# Patient Record
Sex: FEMALE | Race: WHITE | URBAN - METROPOLITAN AREA
[De-identification: names, ages, dates, MRNs, and addresses within clinical notes are randomized per-mention and may not be internally consistent; named-entity substitution may affect disease eponyms.]

---

## 2017-07-11 ENCOUNTER — HOSPITAL ENCOUNTER (OUTPATIENT)
Dept: ULTRASOUND IMAGING | Age: 67
Discharge: HOME | End: 2017-07-11
Admitting: INTERNAL MEDICINE

## 2017-07-11 ENCOUNTER — HOSPITAL ENCOUNTER (OUTPATIENT)
Dept: MAMMOGRAPHY | Age: 67
Discharge: HOME | End: 2017-07-11
Admitting: INTERNAL MEDICINE

## 2017-07-11 DIAGNOSIS — R92.8 OTHER (ABNORMAL) FINDINGS ON RADIOLOGICAL EXAMINATION OF BREAST: ICD-10-CM

## 2017-10-20 ENCOUNTER — HOSPITAL ENCOUNTER (OUTPATIENT)
Dept: MAMMOGRAPHY | Age: 67
Discharge: HOME | End: 2017-10-20
Attending: INTERNAL MEDICINE | Admitting: INTERNAL MEDICINE

## 2017-10-20 DIAGNOSIS — Z12.31 VISIT FOR SCREENING MAMMOGRAM: ICD-10-CM

## 2018-10-26 ENCOUNTER — HOSPITAL ENCOUNTER (OUTPATIENT)
Dept: MAMMOGRAPHY | Age: 68
Discharge: HOME | End: 2018-10-31
Payer: MEDICAID

## 2018-10-26 DIAGNOSIS — Z12.31 VISIT FOR SCREENING MAMMOGRAM: ICD-10-CM

## 2018-10-26 PROCEDURE — 77067 SCR MAMMO BI INCL CAD: CPT

## 2022-02-25 ENCOUNTER — OFFICE VISIT (OUTPATIENT)
Dept: ENT CLINIC | Age: 72
End: 2022-02-25

## 2022-02-25 VITALS
HEIGHT: 59 IN | WEIGHT: 202 LBS | BODY MASS INDEX: 40.72 KG/M2 | SYSTOLIC BLOOD PRESSURE: 188 MMHG | DIASTOLIC BLOOD PRESSURE: 82 MMHG | TEMPERATURE: 97.2 F

## 2022-02-25 DIAGNOSIS — J32.4 CHRONIC PANSINUSITIS: ICD-10-CM

## 2022-02-25 DIAGNOSIS — J30.9 ALLERGIC RHINITIS, UNSPECIFIED SEASONALITY, UNSPECIFIED TRIGGER: Primary | ICD-10-CM

## 2022-02-25 PROCEDURE — 31231 NASAL ENDOSCOPY DX: CPT | Performed by: STUDENT IN AN ORGANIZED HEALTH CARE EDUCATION/TRAINING PROGRAM

## 2022-02-25 PROCEDURE — 99204 OFFICE O/P NEW MOD 45 MIN: CPT | Performed by: STUDENT IN AN ORGANIZED HEALTH CARE EDUCATION/TRAINING PROGRAM

## 2022-02-25 RX ORDER — AZELASTINE 1 MG/ML
1 SPRAY, METERED NASAL 2 TIMES DAILY
Qty: 30 ML | Refills: 3 | Status: SHIPPED | OUTPATIENT
Start: 2022-02-25

## 2022-02-25 RX ORDER — FLUTICASONE PROPIONATE 50 MCG
1 SPRAY, SUSPENSION (ML) NASAL 2 TIMES DAILY
Qty: 16 G | Refills: 2 | Status: SHIPPED | OUTPATIENT
Start: 2022-02-25

## 2022-02-25 ASSESSMENT — ENCOUNTER SYMPTOMS
SINUS PAIN: 1
RHINORRHEA: 0
SHORTNESS OF BREATH: 0
EYE PAIN: 0
SINUS PRESSURE: 1
VOMITING: 0
COUGH: 0
NAUSEA: 0

## 2022-02-25 NOTE — PROGRESS NOTES
[facility]      Patient Name: Albert Manzo  Medical Record Number:  [id number]  Primary Care Physician:  Elly Manzano  Date of Consultation: 2022    Chief Complaint:   Chief Complaint   Patient presents with    New Patient     Patient states she hasa a lot of sinus congestion and pressure x 1 year        HISTORY OF PRESENT ILLNESS  Shayan Tinoco is a(n) 73 y.o. female who presents for evaluation of nasal congestion facial pain and pressure. She states this has been going on for approximately a year. She has tried Flonase with minimal relief. She is never had any allergy testing. She states she gets 3-4 sinus infections per year requiring antibiotics. She does not know when her last antibiotic was. She has not had any recent head and neck imaging. She is never had any nose or sinus surgeries. Patient Active Problem List   Diagnosis    Diabetes [facility])     Past Surgical History:   Procedure Laterality Date    CATARACT REMOVAL      KNEE SURGERY       No family history on file.   Social History     Socioeconomic History    Marital status: Single     Spouse name: Not on file    Number of children: Not on file    Years of education: Not on file    Highest education level: Not on file   Occupational History    Not on file   Tobacco Use    Smoking status: Former Smoker     Packs/day: 0.50     Years: 44.00     Pack years: 22.00     Start date:      Quit date:      Years since quittin.1    Smokeless tobacco: Never Used   Vaping Use    Vaping Use: Never used   Substance and Sexual Activity    Alcohol use: No    Drug use: No    Sexual activity: Never   Other Topics Concern    Not on file   Social History Narrative    Not on file     Social Determinants of Health     Financial Resource Strain:     Difficulty of Paying Living Expenses: Not on file   Food Insecurity:     Worried About Running Out of Food in the Last Year: Not on file    [address] in the Last Year: Not on file   Transportation Needs:     Lack of Transportation (Medical): Not on file    Lack of Transportation (Non-Medical): Not on file   Physical Activity:     Days of Exercise per Week: Not on file    Minutes of Exercise per Session: Not on file   Stress:     Feeling of Stress : Not on file   Social Connections:     Frequency of Communication with Friends and Family: Not on file    Frequency of Social Gatherings with Friends and Family: Not on file    Attends Nondenominational Services: Not on file    Active Member of [address] [place] [address] [employer] or Organizations: Not on file    Attends Club or Organization Meetings: Not on file    Marital Status: Not on file   Intimate Partner Violence:     Fear of Current or Ex-Partner: Not on file    Emotionally Abused: Not on file    Physically Abused: Not on file    Sexually Abused: Not on file   Housing Stability:     Unable to Pay for Housing in the Last Year: Not on file    Number of [place] in the Last Year: Not on file    Unstable Housing in the Last Year: Not on file       DRUG/FOOD ALLERGIES: Sulfa antibiotics, Codeine, Naprosyn [naproxen], Percocet [oxycodone-acetaminophen], and Vicodin [hydrocodone-acetaminophen]    CURRENT MEDICATIONS  Prior to Admission medications    Medication Sig Start Date End Date Taking? Authorizing Provider   azelastine (ASTELIN) 0.1 % nasal spray 1 spray by Nasal route 2 times daily Use in each nostril as directed 2/25/22  Yes Aaron Chowdhury DO   fluticasone (FLONASE) 50 MCG/ACT nasal spray 1 spray by Each Nostril route 2 times daily 2/25/22  Yes Aaron Chowdhury DO       REVIEW OF SYSTEMS  The following systems were reviewed and revealed the following in addition to any already discussed in the HPI:    Review of Systems   Constitutional: Negative for fatigue and fever. HENT: Positive for congestion, sinus pressure and sinus pain. Negative for ear pain, postnasal drip, rhinorrhea and sneezing.     Eyes: Negative for pain and visual disturbance. Respiratory: Negative for cough and shortness of breath. Cardiovascular: Negative for chest pain. Gastrointestinal: Negative for nausea and vomiting. Endocrine: Negative. Genitourinary: Negative. Musculoskeletal: Negative for neck pain and neck stiffness. Skin: Negative for rash. Neurological: Negative for dizziness and headaches. PHYSICAL EXAM  BP (!) 188/82 (Site: Left Upper Arm, Position: Sitting) Comment: Nervous, states she took her bp med this morning  Temp 97.2 °F (36.2 °C)   Ht 4' 10.75\" (1.492 m)   Wt 202 lb (91.6 kg)   BMI 41.15 kg/m²     GENERAL: No Acute Distress, Alert and Oriented, no hoarseness  EYES: EOMI, Anti-icteric  NOSE: No epistaxis, nasal mucosa within normal limits, no purulent drainage  EARS: Normal external canal appearance, EAC patent bilaterally TMs intact bilaterally with no evidence of effusion  FACE: 1/6 House-Brackmann Scale, symmetric, sensation equal bilaterally  ORAL CAVITY: No masses or lesions palpated, uvula is midline, moist mucous membranes  NECK: Normal range of motion, no thyromegaly, trachea is midline, no lymphadenopathy, no neck masses, no crepitus  CHEST: Normal respiratory effort, no retractions, breathing comfortably  SKIN: No rashes, normal appearing skin, no evidence of skin lesions/tumors    RADIOLOGY  Summary of findings:      PROCEDURE  Nasal Endoscopy    Was performed due to chronic rhinosinusitis    Verbal consent was received. After topical anesthesia and decongestion had been obtained using aerosolized 1% lidocaine and oxymetazoline, a 45 degree rigid endoscope was placed into both nares with the patient in a sitting position.  The following was observed:    Right Nasal Cavity and Paranasal Sinuses:  Polyp score = 0 (0 = no polyps, 1 = small polyps in middle meatus not reaching below the inferior border of the middle kyleigh, 2 = polyps reaching below the middle border of the middle turbinate, 3= large polyps reaching the lower border of the inferior turbinate or polyps medial to the middle kyleigh, 4= large polyps causing almost complete congestion/obstruction of the interior meatus)  Edema score = 2 (0 = absent, 1 = mild, 2 = severe)  Discharge score = 1 (0 = no discharge, 1 = clear thin discharge, 2 = thick purulent discharge)    Left Nasal Cavity and Paranasal Sinuses:    Polyp score = 0 (0 = no polyps, 1 = small polyps in middle meatus not reaching below the inferior border of the middle kyleigh, 2 = polyps reaching below the middle border of the middle turbinate, 3= large polyps reaching the lower border of the inferior turbinate or polyps medial to the middle kyleigh, 4= large polyps causing almost complete congestion/obstruction of the interior meatus)  Edema score = 2 (0 = absent, 1 = mild, 2 = severe)  Discharge score = 1 (0 = no discharge, 1 = clear thin discharge, 2 = thick purulent discharge)    Septum: intact and deviated to the right  Other: The inferior and middle turbinates were examined. There were no complications. ASSESSMENT/PLAN  Geronimo Bhakta is a very pleasant 73 y.o. female with     1. Allergic rhinitis, unspecified seasonality, unspecified trigger  I will begin her on Astelin and Flonase. If she continues have symptoms we will add an oral antihistamine and then potentially allergy testing  - azelastine (ASTELIN) 0.1 % nasal spray; 1 spray by Nasal route 2 times daily Use in each nostril as directed  Dispense: 30 mL; Refill: 3  - fluticasone (FLONASE) 50 MCG/ACT nasal spray; 1 spray by Each Nostril route 2 times daily  Dispense: 16 g; Refill: 2    2. Chronic pansinusitis  When she developed her next sinus infection I would like to see her expectantly obtain cultures. We also will potentially obtain a CT scan as she is getting 3-4 infections per year.   She would like to hold off on the CT scan at this point time  - [place] NASAL ENDOSCOPY,DX    She will follow-up in approximate 3 months    Medical Decision Making:   The following items were considered in medical decision making:  Independent review of images  Review / order clinical lab tests  Review / order radiology tests  Decision to obtain old records

## 2022-05-27 ENCOUNTER — OFFICE VISIT (OUTPATIENT)
Dept: ENT CLINIC | Age: 72
End: 2022-05-27

## 2022-05-27 VITALS
DIASTOLIC BLOOD PRESSURE: 73 MMHG | BODY MASS INDEX: 41.15 KG/M2 | TEMPERATURE: 97.3 F | SYSTOLIC BLOOD PRESSURE: 162 MMHG | HEIGHT: 59 IN

## 2022-05-27 DIAGNOSIS — J32.4 CHRONIC PANSINUSITIS: ICD-10-CM

## 2022-05-27 DIAGNOSIS — J30.2 SEASONAL ALLERGIES: Primary | ICD-10-CM

## 2022-05-27 PROCEDURE — 99213 OFFICE O/P EST LOW 20 MIN: CPT | Performed by: STUDENT IN AN ORGANIZED HEALTH CARE EDUCATION/TRAINING PROGRAM

## 2022-05-27 PROCEDURE — 1123F ACP DISCUSS/DSCN MKR DOCD: CPT | Performed by: STUDENT IN AN ORGANIZED HEALTH CARE EDUCATION/TRAINING PROGRAM

## 2022-05-27 PROCEDURE — 31231 NASAL ENDOSCOPY DX: CPT | Performed by: STUDENT IN AN ORGANIZED HEALTH CARE EDUCATION/TRAINING PROGRAM

## 2022-05-27 RX ORDER — DOXYCYCLINE HYCLATE 100 MG
100 TABLET ORAL 2 TIMES DAILY
COMMUNITY

## 2022-05-27 RX ORDER — OMEPRAZOLE 20 MG/1
20 CAPSULE, DELAYED RELEASE ORAL DAILY
COMMUNITY

## 2022-05-27 RX ORDER — METHOCARBAMOL 500 MG/1
250 TABLET, FILM COATED ORAL DAILY
COMMUNITY

## 2022-05-27 RX ORDER — FAMOTIDINE 10 MG
10 TABLET ORAL 2 TIMES DAILY
COMMUNITY

## 2022-05-27 RX ORDER — ATORVASTATIN CALCIUM 10 MG/1
10 TABLET, FILM COATED ORAL DAILY
COMMUNITY

## 2022-05-27 RX ORDER — TRAZODONE HYDROCHLORIDE 150 MG/1
75 TABLET ORAL NIGHTLY
COMMUNITY

## 2022-05-27 RX ORDER — CETIRIZINE HYDROCHLORIDE 10 MG/1
10 TABLET ORAL DAILY
COMMUNITY

## 2022-05-27 RX ORDER — AMLODIPINE BESYLATE 5 MG/1
5 TABLET ORAL DAILY
COMMUNITY

## 2022-05-27 RX ORDER — BRIMONIDINE TARTRATE 2 MG/ML
1 SOLUTION/ DROPS OPHTHALMIC 3 TIMES DAILY
COMMUNITY

## 2022-05-27 RX ORDER — SUMATRIPTAN 100 MG/1
100 TABLET, FILM COATED ORAL
COMMUNITY

## 2022-05-27 RX ORDER — DORZOLAMIDE HYDROCHLORIDE AND TIMOLOL MALEATE 20; 5 MG/ML; MG/ML
1 SOLUTION/ DROPS OPHTHALMIC 2 TIMES DAILY
COMMUNITY

## 2022-05-27 RX ORDER — ACETAMINOPHEN 325 MG/1
650 TABLET ORAL EVERY 6 HOURS PRN
COMMUNITY

## 2022-05-27 ASSESSMENT — ENCOUNTER SYMPTOMS
SHORTNESS OF BREATH: 0
RHINORRHEA: 0
EYE PAIN: 0
COUGH: 0
SINUS PRESSURE: 1
VOMITING: 0
SINUS PAIN: 1
NAUSEA: 0

## 2022-05-27 NOTE — PROGRESS NOTES
Strain:     Difficulty of Paying Living Expenses: Not on file   Food Insecurity:     Worried About Running Out of Food in the Last Year: Not on file    [place] of Food in the Last Year: Not on file   Transportation Needs:     Lack of Transportation (Medical): Not on file    Lack of Transportation (Non-Medical): Not on file   Physical Activity:     Days of Exercise per Week: Not on file    Minutes of Exercise per Session: Not on file   Stress:     Feeling of Stress : Not on file   Social Connections:     Frequency of Communication with Friends and Family: Not on file    Frequency of Social Gatherings with Friends and Family: Not on file    Attends Denominational Services: Not on file    Active Member of [address] [place] [address] [employer] or Organizations: Not on file    Attends Club or Organization Meetings: Not on file    Marital Status: Not on file   Intimate Partner Violence:     Fear of Current or Ex-Partner: Not on file    Emotionally Abused: Not on file    Physically Abused: Not on file    Sexually Abused: Not on file   Housing Stability:     Unable to Pay for Housing in the Last Year: Not on file    Number of [place] in the Last Year: Not on file    Unstable Housing in the Last Year: Not on file       DRUG/FOOD ALLERGIES: Sulfa antibiotics, Codeine, Naprosyn [naproxen], Percocet [oxycodone-acetaminophen], and Vicodin [hydrocodone-acetaminophen]    CURRENT MEDICATIONS  Prior to Admission medications    Medication Sig Start Date End Date Taking?  Authorizing Provider   acetaminophen (TYLENOL) 325 mg tablet Take 650 mg by mouth every 6 hours as needed for Pain   Yes Historical Provider, MD   amLODIPine (NORVASC) 5 MG tablet Take 5 mg by mouth daily   Yes Historical Provider, MD   carboxymethylcellulose 1 % ophthalmic solution 1 drop 3 times daily   Yes Historical Provider, MD   atorvastatin (LIPITOR) 10 MG tablet Take 10 mg by mouth daily   Yes Historical Provider, MD   brimonidine (ALPHAGAN) 0.2 % ophthalmic solution 1 drop 3 times daily   Yes Historical Provider, MD   cetirizine (ZYRTEC) 10 MG tablet Take 10 mg by mouth daily   Yes Historical Provider, MD   vitamin D (CHOLECALCIFEROL) 25 MCG (1000 UT) TABS tablet Take 1,000 Units by mouth daily   Yes Historical Provider, MD   dorzolamide-timolol (COSOPT) 22.3-6.8 MG/ML ophthalmic solution 1 drop 2 times daily   Yes Historical Provider, MD   diclofenac sodium (VOLTAREN) 1 % GEL Apply topically 2 times daily   Yes Historical Provider, MD   doxycycline hyclate (VIBRA-TABS) 100 MG tablet Take 100 mg by mouth 2 times daily   Yes Historical Provider, MD   famotidine (PEPCID) 10 MG tablet Take 10 mg by mouth 2 times daily   Yes Historical Provider, MD   methocarbamol (ROBAXIN) 500 MG tablet Take 250 mg by mouth daily   Yes Historical Provider, MD   omeprazole (PRILOSEC) 20 MG delayed release capsule Take 20 mg by mouth daily   Yes Historical Provider, MD   SUMAtriptan (IMITREX) 100 MG tablet Take 100 mg by mouth once as needed for Migraine   Yes Historical Provider, MD   tobramycin-dexamethasone (TOBRADEX) 0.3-0.1 % ophthalmic ointment 3 times daily 3 times daily. Yes Historical Provider, MD   traZODone (DESYREL) 150 MG tablet Take 75 mg by mouth nightly   Yes Historical Provider, MD   azelastine (ASTELIN) 0.1 % nasal spray 1 spray by Nasal route 2 times daily Use in each nostril as directed 2/25/22  Yes Aaron Chowdhury DO   fluticasone (FLONASE) 50 MCG/ACT nasal spray 1 spray by Each Nostril route 2 times daily 2/25/22  Yes Aaron Chowdhury,        REVIEW OF SYSTEMS  The following systems were reviewed and revealed the following in addition to any already discussed in the HPI:    Review of Systems   Constitutional: Negative for fatigue and fever. HENT: Positive for congestion, sinus pressure and sinus pain. Negative for ear pain, postnasal drip, rhinorrhea and sneezing. Eyes: Negative for pain and visual disturbance. Respiratory: Negative for cough and shortness of breath. Cardiovascular: Negative for chest pain. Gastrointestinal: Negative for nausea and vomiting. Endocrine: Negative. Genitourinary: Negative. Musculoskeletal: Negative for neck pain and neck stiffness. Skin: Negative for rash. Neurological: Negative for dizziness and headaches. PHYSICAL EXAM  BP (!) 162/73 (Site: Right Upper Arm, Position: Sitting) Comment: Patient states she took her morning bp meds  Temp 97.3 °F (36.3 °C)   Ht 4' 10.75\" (1.492 m)   BMI 41.15 kg/m²     GENERAL: No Acute Distress, Alert and Oriented, no hoarseness  EYES: EOMI, Anti-icteric  NOSE: No epistaxis, nasal mucosa within normal limits, no purulent drainage  EARS: Normal external canal appearance, EAC patent bilaterally TMs intact bilaterally with no evidence of effusion  FACE: 1/6 House-Brackmann Scale, symmetric, sensation equal bilaterally  ORAL CAVITY: No masses or lesions palpated, uvula is midline, moist mucous membranes  NECK: Normal range of motion, no thyromegaly, trachea is midline, no lymphadenopathy, no neck masses, no crepitus  CHEST: Normal respiratory effort, no retractions, breathing comfortably  SKIN: No rashes, normal appearing skin, no evidence of skin lesions/tumors    RADIOLOGY  Summary of findings:      PROCEDURE  Nasal Endoscopy    Was performed due to chronic rhinosinusitis    Verbal consent was received. After topical anesthesia and decongestion had been obtained using aerosolized 1% lidocaine and oxymetazoline, a 45 degree rigid endoscope was placed into both nares with the patient in a sitting position.  The following was observed:    Right Nasal Cavity and Paranasal Sinuses:  Polyp score = 0 (0 = no polyps, 1 = small polyps in middle meatus not reaching below the inferior border of the middle kyleigh, 2 = polyps reaching below the middle border of the middle turbinate, 3= large polyps reaching the lower border of the inferior turbinate or polyps medial to the middle kyleigh, 4= large polyps causing almost complete congestion/obstruction of the interior meatus)  Edema score = 2 (0 = absent, 1 = mild, 2 = severe)  Discharge score = 1 (0 = no discharge, 1 = clear thin discharge, 2 = thick purulent discharge)    Left Nasal Cavity and Paranasal Sinuses:    Polyp score = 0 (0 = no polyps, 1 = small polyps in middle meatus not reaching below the inferior border of the middle kyleigh, 2 = polyps reaching below the middle border of the middle turbinate, 3= large polyps reaching the lower border of the inferior turbinate or polyps medial to the middle kyleigh, 4= large polyps causing almost complete congestion/obstruction of the interior meatus)  Edema score = 2 (0 = absent, 1 = mild, 2 = severe)  Discharge score = 1 (0 = no discharge, 1 = clear thin discharge, 2 = thick purulent discharge)    Septum: intact and deviated to the right  Other: The inferior and middle turbinates were examined. There were no complications. ASSESSMENT/PLAN  Olga Hayes is a very pleasant 73 y.o. female with     1. Seasonal allergies  She will continue using her Astelin Flonase and Zyrtec as she has had good relief from her nasal congestion from this. 2. Chronic pansinusitis  No evidence of infection on exam today. We will not proceed with any imaging as she has had good relief with her allergy regimen.  - [place] NASAL ENDOSCOPY,DX    She will follow-up in 6 months    Medical Decision Making:   The following items were considered in medical decision making:  Independent review of images  Review / order clinical lab tests  Review / order radiology tests  Decision to obtain old records

## 2022-11-28 ASSESSMENT — ENCOUNTER SYMPTOMS
RHINORRHEA: 0
SHORTNESS OF BREATH: 0
NAUSEA: 0
VOMITING: 0
COUGH: 0
EYE PAIN: 0

## 2022-11-29 ENCOUNTER — OFFICE VISIT (OUTPATIENT)
Dept: ENT CLINIC | Age: 72
End: 2022-11-29
Payer: MEDICAID

## 2022-11-29 VITALS
HEART RATE: 84 BPM | DIASTOLIC BLOOD PRESSURE: 82 MMHG | HEIGHT: 58 IN | SYSTOLIC BLOOD PRESSURE: 149 MMHG | TEMPERATURE: 97 F | RESPIRATION RATE: 16 BRPM | BODY MASS INDEX: 42.22 KG/M2

## 2022-11-29 DIAGNOSIS — J30.2 SEASONAL ALLERGIES: Primary | ICD-10-CM

## 2022-11-29 DIAGNOSIS — J32.4 CHRONIC PANSINUSITIS: ICD-10-CM

## 2022-11-29 PROCEDURE — 99213 OFFICE O/P EST LOW 20 MIN: CPT | Performed by: STUDENT IN AN ORGANIZED HEALTH CARE EDUCATION/TRAINING PROGRAM

## 2022-11-29 PROCEDURE — 1123F ACP DISCUSS/DSCN MKR DOCD: CPT | Performed by: STUDENT IN AN ORGANIZED HEALTH CARE EDUCATION/TRAINING PROGRAM

## 2022-11-29 ASSESSMENT — ENCOUNTER SYMPTOMS
SINUS PRESSURE: 0
SINUS PAIN: 0

## 2022-11-29 NOTE — PROGRESS NOTES
[facility]      Patient Name: Valeri Suarez  Medical Record Number:  [id number]  Primary Care Physician:  Wade Hines  Date of Consultation: 2022    Chief Complaint:   Chief Complaint   Patient presents with    Follow-up     Patient here today for a follow up on her allergies. Patient states that she is doing much better. HISTORY OF PRESENT ILLNESS  Siomara Stark is a(n) 81 y.o. female who presents for evaluation of nasal congestion facial pain and pressure. She states this has been going on for approximately a year. She has tried Flonase with minimal relief. She is never had any allergy testing. She states she gets 3-4 sinus infections per year requiring antibiotics. She does not know when her last antibiotic was. She has not had any recent head and neck imaging. She is never had any nose or sinus surgeries. Interval History 2022  Siomara Stark has been doing well on the Astelin and Flonase nasal sprays. She is extremely happy and has no complaints today    Interval History 2022  Siomara Stark has been doing well and has not had any sinus infections since starting all of her allergy medications. Patient Active Problem List   Diagnosis    Diabetes [facility])     Past Surgical History:   Procedure Laterality Date    CATARACT REMOVAL      KNEE SURGERY       No family history on file. Social History     Socioeconomic History    Marital status: Single     Spouse name: Not on file    Number of children: Not on file    Years of education: Not on file    Highest education level: Not on file   Occupational History    Not on file   Tobacco Use    Smoking status: Former     Packs/day: 0.50     Years: 44.00     Pack years: 22.00     Types: Cigarettes     Start date: 5     Quit date:      Years since quittin.9    Smokeless tobacco: Never   Vaping Use    Vaping Use: Never used   Substance and Sexual Activity    Alcohol use: No    Drug use:  No Sexual activity: Never   Other Topics Concern    Not on file   Social History Narrative    Not on file     Social Determinants of Health     Financial Resource Strain: Not on file   Food Insecurity: Not on file   Transportation Needs: Not on file   Physical Activity: Not on file   Stress: Not on file   Social Connections: Not on file   Intimate Partner Violence: Not on file   Housing Stability: Not on file       DRUG/FOOD ALLERGIES: Sulfa antibiotics, Codeine, Naprosyn [naproxen], Percocet [oxycodone-acetaminophen], and Vicodin [hydrocodone-acetaminophen]    CURRENT MEDICATIONS  Prior to Admission medications    Medication Sig Start Date End Date Taking?  Authorizing Provider   acetaminophen (TYLENOL) 325 mg tablet Take 650 mg by mouth every 6 hours as needed for Pain   Yes Historical Provider, MD   amLODIPine (NORVASC) 5 MG tablet Take 5 mg by mouth daily   Yes Historical Provider, MD   atorvastatin (LIPITOR) 10 MG tablet Take 10 mg by mouth daily   Yes Historical Provider, MD   brimonidine (ALPHAGAN) 0.2 % ophthalmic solution 1 drop 3 times daily   Yes Historical Provider, MD   cetirizine (ZYRTEC) 10 MG tablet Take 10 mg by mouth daily   Yes Historical Provider, MD   dorzolamide-timolol (COSOPT) 22.3-6.8 MG/ML ophthalmic solution 1 drop 2 times daily   Yes Historical Provider, MD   famotidine (PEPCID) 10 MG tablet Take 10 mg by mouth 2 times daily   Yes Historical Provider, MD   methocarbamol (ROBAXIN) 500 MG tablet Take 250 mg by mouth daily   Yes Historical Provider, MD   SUMAtriptan (IMITREX) 100 MG tablet Take 100 mg by mouth once as needed for Migraine   Yes Historical Provider, MD   traZODone (DESYREL) 150 MG tablet Take 75 mg by mouth nightly   Yes Historical Provider, MD   azelastine (ASTELIN) 0.1 % nasal spray 1 spray by Nasal route 2 times daily Use in each nostril as directed 2/25/22  Yes Aaron Chowdhury DO   fluticasone (FLONASE) 50 MCG/ACT nasal spray 1 spray by Each Nostril route 2 times daily 2/25/22 Yes Aaron Chowdhury, DO   carboxymethylcellulose 1 % ophthalmic solution 1 drop 3 times daily  Patient not taking: Reported on 11/29/2022    Historical Provider, MD   vitamin D (CHOLECALCIFEROL) 25 MCG (1000 UT) TABS tablet Take 1,000 Units by mouth daily  Patient not taking: Reported on 11/29/2022    Historical Provider, MD   diclofenac sodium (VOLTAREN) 1 % GEL Apply topically 2 times daily  Patient not taking: Reported on 11/29/2022    Historical Provider, MD   doxycycline hyclate (VIBRA-TABS) 100 MG tablet Take 100 mg by mouth 2 times daily  Patient not taking: Reported on 11/29/2022    Historical Provider, MD   omeprazole (PRILOSEC) 20 MG delayed release capsule Take 20 mg by mouth daily  Patient not taking: Reported on 11/29/2022    Historical Provider, MD   tobramycin-dexamethasone (TOBRADEX) 0.3-0.1 % ophthalmic ointment 3 times daily 3 times daily. Patient not taking: Reported on 11/29/2022    Historical Provider, MD       REVIEW OF SYSTEMS  The following systems were reviewed and revealed the following in addition to any already discussed in the HPI:    Review of Systems   Constitutional:  Negative for fatigue and fever. HENT:  Negative for congestion, ear pain, postnasal drip, rhinorrhea, sinus pressure, sinus pain and sneezing. Eyes:  Negative for pain and visual disturbance. Respiratory:  Negative for cough and shortness of breath. Cardiovascular:  Negative for chest pain. Gastrointestinal:  Negative for nausea and vomiting. Endocrine: Negative. Genitourinary: Negative. Musculoskeletal:  Negative for neck pain and neck stiffness. Skin:  Negative for rash. Neurological:  Negative for dizziness and headaches.         PHYSICAL EXAM  BP (!) 149/82 (Site: Right Lower Arm, Position: Sitting, Cuff Size: Medium Adult)   Pulse 84   Temp 97 °F (36.1 °C) (Infrared)   Resp 16   Ht 4' 10\" (1.473 m)   BMI 42.22 kg/m²     GENERAL: No Acute Distress, Alert and Oriented, no hoarseness  EYES: EOMI, Anti-icteric  NOSE: No epistaxis, nasal mucosa within normal limits, no purulent drainage  EARS: Normal external canal appearance, EAC patent bilaterally TMs intact bilaterally with no evidence of effusion  FACE: 1/6 House-Brackmann Scale, symmetric, sensation equal bilaterally  ORAL CAVITY: No masses or lesions palpated, uvula is midline, moist mucous membranes  NECK: Normal range of motion, no thyromegaly, trachea is midline, no lymphadenopathy, no neck masses, no crepitus  CHEST: Normal respiratory effort, no retractions, breathing comfortably  SKIN: No rashes, normal appearing skin, no evidence of skin lesions/tumors    RADIOLOGY  Summary of findings:      PROCEDURE     ASSESSMENT/PLAN  Sinai Oates is a very pleasant 81 y.o. female with     1. Seasonal allergies  She will continue using her Astelin Flonase and Zyrtec for her allergies as they are doing well for her. 2. Chronic pansinusitis  No evidence of infection today. She will follow-up in 1 year or sooner if needed. Medical Decision Making:   The following items were considered in medical decision making:  Independent review of images  Review / order clinical lab tests  Review / order radiology tests  Decision to obtain old records